# Patient Record
Sex: FEMALE | Race: WHITE | NOT HISPANIC OR LATINO | Employment: FULL TIME | URBAN - METROPOLITAN AREA
[De-identification: names, ages, dates, MRNs, and addresses within clinical notes are randomized per-mention and may not be internally consistent; named-entity substitution may affect disease eponyms.]

---

## 2024-07-15 ENCOUNTER — OFFICE VISIT (OUTPATIENT)
Dept: URGENT CARE | Facility: URGENT CARE | Age: 24
End: 2024-07-15
Payer: COMMERCIAL

## 2024-07-15 ENCOUNTER — NURSE TRIAGE (OUTPATIENT)
Dept: FAMILY MEDICINE | Facility: CLINIC | Age: 24
End: 2024-07-15
Payer: COMMERCIAL

## 2024-07-15 VITALS
BODY MASS INDEX: 23.04 KG/M2 | HEIGHT: 63 IN | SYSTOLIC BLOOD PRESSURE: 103 MMHG | WEIGHT: 130 LBS | HEART RATE: 59 BPM | DIASTOLIC BLOOD PRESSURE: 66 MMHG | TEMPERATURE: 98.1 F | OXYGEN SATURATION: 100 %

## 2024-07-15 DIAGNOSIS — N64.52 NIPPLE DISCHARGE IN FEMALE: Primary | ICD-10-CM

## 2024-07-15 DIAGNOSIS — R59.0 INGUINAL LYMPHADENOPATHY: ICD-10-CM

## 2024-07-15 PROCEDURE — 99203 OFFICE O/P NEW LOW 30 MIN: CPT | Performed by: PHYSICIAN ASSISTANT

## 2024-07-15 NOTE — PROGRESS NOTES
"  Assessment & Plan     Nipple discharge in female  Consistent with small infection that resolved when popped. Use topical bacitracin x 4-5 days, follow up if ssx persist.    Inguinal lymphadenopathy  Leave area alone to encourage lymph node to shrink, if no change after 3 weeks of leaving it, then follow up with primary. If any change in symptoms, be seen sooner. Patient understands and is amenable to plan.      Karlo Khan is a 23 year old, presenting for the following health issues: Removed nipple piercing 4 months ago. Last night, on right nipple, noted swelling and redness. When she pressed on it, she felt something pop and there was a little white and red discharge through the front of the nipple. No pain, no other symptoms, seems better today.    Also, she notes a lump on the right side of her labia, in the groin between the labia and the thigh, no discharge, no erythema, no itching.           No data to display              HPI       Review of Systems  Constitutional, HEENT, cardiovascular, pulmonary, gi and gu systems are negative, except as otherwise noted.      Objective    /66   Pulse 59   Temp 98.1  F (36.7  C) (Temporal)   Ht 1.6 m (5' 3\")   Wt 59 kg (130 lb)   SpO2 100%   BMI 23.03 kg/m    Body mass index is 23.03 kg/m .  Physical Exam   GENERAL: alert and no distress  BREAST: normal without masses, tenderness or nipple discharge, no palpable axillary masses or adenopathy, and well healed piercing scar, no erythema, no swelling, not able to expel any fluids   (female): normal female external genitalia and 5mm oval mass felt in right inguinal area, consistent with a lymph node  PSYCH: mentation appears normal, affect normal/bright    No results found for this or any previous visit (from the past 24 hour(s)).        Signed Electronically by: Zaina Sanchez PA-C    "

## 2024-07-15 NOTE — TELEPHONE ENCOUNTER
"Call received from patient:  Nipple piercings for 4 years  Removed piercings 4 months ago  Last night near where a hole was nipple was more edematous  Pus pocket popped and discharged out of nipple-one drop  A little blood tinged pus-small drop  No pregnancy; has IUD  Right nipple  Has not checked for discharge today  No erythema  Looks better today-spot where pus was located   No pain  Temperature reported to writer during phone call: 97.9^F    Writer offered appt with Dr. Escobar on 7/16/24 at 0810 and patient declined due to work schedule.  Patient asked about Urgent Care-writer reviewed [facility] hours and they see patients on a walk-in basis.  Please arrive by 1800 as they tend to reach capacity by that time of day.      Patient verbalized understanding and in agreement with plan.    REDD BallardN, RN-BC  [facility]    Reason for Disposition   Nipple discharge and bloody    Additional Information   Negative: Chest pain   Negative: Breastfeeding questions about baby   Negative: Breastfeeding questions about mother (breast symptoms or feeling sick)   Negative: Breastfeeding questions about mother's medicines and diet   Negative: Postpartum breast pain and swelling, not breastfeeding   Negative: Small spot, skin growth or mole   Negative: SEVERE breast pain and fever > 103 F  (39.4 C)   Negative: Patient sounds very sick or weak to the triager   Negative: Breast looks infected (spreading redness, feels hot or painful to touch) and fever   Negative: Breast looks infected (spreading redness, feels hot or painful to touch) and no fever   Negative: Painful rash and multiple small blisters grouped together (i.e., dermatomal distribution or \"band\" or \"stripe\")   Negative: Cuts, burns, or bruises of breasts and suspicious history for the injury   Negative: Breast lump    Protocols used: Breast Symptoms-A-[place]    "